# Patient Record
Sex: MALE | Race: WHITE | NOT HISPANIC OR LATINO | Employment: FULL TIME | URBAN - METROPOLITAN AREA
[De-identification: names, ages, dates, MRNs, and addresses within clinical notes are randomized per-mention and may not be internally consistent; named-entity substitution may affect disease eponyms.]

---

## 2023-08-07 ENCOUNTER — OFFICE VISIT (OUTPATIENT)
Dept: FAMILY MEDICINE | Facility: CLINIC | Age: 38
End: 2023-08-07
Payer: COMMERCIAL

## 2023-08-07 VITALS
OXYGEN SATURATION: 99 % | TEMPERATURE: 98 F | HEART RATE: 64 BPM | WEIGHT: 191.4 LBS | HEIGHT: 72 IN | SYSTOLIC BLOOD PRESSURE: 138 MMHG | BODY MASS INDEX: 25.92 KG/M2 | DIASTOLIC BLOOD PRESSURE: 82 MMHG | RESPIRATION RATE: 16 BRPM

## 2023-08-07 DIAGNOSIS — M79.89 MASS OF SOFT TISSUE OF LOWER LEG: Primary | ICD-10-CM

## 2023-08-07 DIAGNOSIS — F17.200 TOBACCO USE DISORDER: ICD-10-CM

## 2023-08-07 PROCEDURE — 99204 OFFICE O/P NEW MOD 45 MIN: CPT | Performed by: FAMILY MEDICINE

## 2023-08-07 ASSESSMENT — PAIN SCALES - GENERAL: PAINLEVEL: NO PAIN (0)

## 2023-08-07 NOTE — PROGRESS NOTES
Assessment & Plan     Mass of soft tissue of lower leg  First consideration is lipoma.  DDx: calf muscle injury, muscular tumor, other occult mass  No dysfunction identified on exam  Imaging offered to rule out less benign conditions than lipoma.  Patient agreed to plan.  Exam did not reveal weakness of lower extremity to suggest muscle atrophy.  - MR Tibia Fibula Lower Leg Right wo Contrast    Tobacco use disorder  Discussed options for cessation.  Prescription for varenicline sent.  Advised to watch out for possible side effects and to call clinic if these occur.  Patient declined smoking cessatsion handouts. Virtual visit for follow up in 1 month for a follow up.   - varenicline (CHANTIX EMILE) 0.5 MG X 11 & 1 MG X 42 tablet  Dispense: 53 tablet; Refill: 0       Nicotine/Tobacco Cessation:  He reports that he has been smoking cigarettes. He has been exposed to tobacco smoke. He has never used smokeless tobacco.  Nicotine/Tobacco Cessation Plan:   Pharmacotherapies : varenicline (Chantix)      Patient Instructions   To schedule the imaging of the right lower leg, call [phone].     Further recommendations when results are out.    Solo Miranda MD  [facility] is a 37 year old, presenting for the following health issues:  Mass (Right calf, pt states that his physical therapist noted the lump and muscle atrophy in his right calf. Pt denies pain. Pt has a hx of lipoma in back neck x1.5 years ago. )        8/7/2023     4:10 PM   Additional Questions   Roomed by Meredith TRAN MA   Accompanied by Self         8/7/2023     4:10 PM   Patient Reported Additional Medications   Patient reports taking the following new medications None       Mass    History of Present Illness       Reason for visit:  Muscle Atrophy with lump in calf region of right leg  Symptom onset:  More than a month  Symptoms include:  Lump and atrophy  Symptom intensity:  Mild  Symptom progression:   Worsening  Had these symptoms before:  Yes  Has tried/received treatment for these symptoms:  No  What makes it worse:  No  What makes it better:  No    He eats 2-3 servings of fruits and vegetables daily.He consumes 3 sweetened beverage(s) daily.He exercises with enough effort to increase his heart rate 20 to 29 minutes per day.  He exercises with enough effort to increase his heart rate 3 or less days per week.   He is taking medications regularly.     Pt states that he first noticed the lump 6mo to 1 year ago and it has increased in size.     Patient asked for Rx for varenicline. He is motivated to quit smokiing.  He said he has spoken to his other provider before but just have not finalized the plan.      Review of Systems   Constitutional, HEENT, cardiovascular, pulmonary, GI, , musculoskeletal, neuro, skin, endocrine and psych systems are negative, except as otherwise noted.      Objective    /82 (BP Location: Left arm, Patient Position: Sitting, Cuff Size: Adult Large)   Pulse 64   Temp 98  F (36.7  C) (Tympanic)   Resp 16   Ht 1.829 m (6')   Wt 86.8 kg (191 lb 6.4 oz)   SpO2 99%   BMI 25.96 kg/m    Body mass index is 25.96 kg/m .  Physical Exam   GEN: alert, oriented x 3, NAD, ambulatory w/o assist and with no antalgia or ataxia  SKIN: multiple tattoos; no rash  RLE: calf only very slightly smaller compared to left but no diminished strength; visible and palpable poorly delineated non-tender subcutaneous mass only on certain contraction of the gastrocnemius; no calf tenderness; able to tip toe and heel walk; no foot drag; soles of his footwear today does not show asymmetric wear.      No results found for any visits on 08/07/23.

## 2023-08-07 NOTE — PATIENT INSTRUCTIONS
To schedule the imaging of the right lower leg, call [phone].     Further recommendations when results are out.

## 2023-08-15 DIAGNOSIS — M79.89 MASS OF SOFT TISSUE OF LOWER LEG: Primary | ICD-10-CM

## 2023-08-15 NOTE — PROGRESS NOTES
Please call patient.  Received notice that his insurance would not approve the MRI of his leg. I chnged the order to ultrasound of the leg for soft tissue mass. Order placed.  Please notify diagnostics to cancel the MRI scheduled for 8/19. Please assist patient in scheduling his US or provide him of diagnostics scheduling number.

## 2023-08-16 NOTE — PROGRESS NOTES
"Pt notified.    He states \"Let's just put this all on hold for now.  I will follow up with my insurance and get back to you if I want to proceed.\"    Susanna Choi RN     "

## 2023-09-10 ENCOUNTER — HEALTH MAINTENANCE LETTER (OUTPATIENT)
Age: 38
End: 2023-09-10

## 2024-11-03 ENCOUNTER — HEALTH MAINTENANCE LETTER (OUTPATIENT)
Age: 39
End: 2024-11-03